# Patient Record
Sex: MALE | Race: WHITE | NOT HISPANIC OR LATINO | URBAN - METROPOLITAN AREA
[De-identification: names, ages, dates, MRNs, and addresses within clinical notes are randomized per-mention and may not be internally consistent; named-entity substitution may affect disease eponyms.]

---

## 2018-02-20 ENCOUNTER — TRANSFERRED RECORDS (OUTPATIENT)
Dept: HEALTH INFORMATION MANAGEMENT | Facility: CLINIC | Age: 53
End: 2018-02-20

## 2018-03-02 ENCOUNTER — PRE VISIT (OUTPATIENT)
Dept: UROLOGY | Facility: CLINIC | Age: 53
End: 2018-03-02

## 2018-03-02 NOTE — TELEPHONE ENCOUNTER
MEDICAL RECORDS REQUEST   [facility]  Urology Clinic  [address] [place] [address]  PHONE: [phone]  Fax: [phone]        FUTURE VISIT INFORMATION                                                   Miller Bauer, : 1965 scheduled for future visit at [facility]    APPOINTMENT INFORMATION:    Date: 2018    Provider:  LAKISHA GREENE    Reason for Visit/Diagnosis: PENILE ENTRAPMENT, BALANITIS     REFERRAL INFORMATION:    Referring provider:  ADELITA JO     Specialty: NP    Referring providers clinic:  [facility] contact number:  [phone]    RECORDS REQUESTED FOR VISIT                                                     NOTES  STATUS/DETAILS   OFFICE NOTE from referring provider  yes   OFFICE NOTE from other specialist  yes   DISCHARGE SUMMARY from hospital  no   DISCHARGE REPORT from the ER  no   OPERATIVE REPORT  yes   MEDICATION LIST  yes   LABS     URINALYSIS (UA)  yes   URINE CYTOLOGY  no   DIAGNOSIS SPECIFIC LABS     PENILE ENTRAPMENT AND BALANITIS  RECORDS RECEIVED SENT TO SCANNING  yes          PRE-VISIT CHECKLIST      Record collection complete Yes   Appointment appropriately scheduled           (right time/right provider) Yes   Joint diagnostic appointment coordinated correctly          (ensure right order & amount of time) Yes   MyChart activation If no,please explain IN PROCESS   Questionnaire complete If no, please explain IN PROCESS     Completed by: Regina Salomon

## 2018-04-10 ENCOUNTER — PRE VISIT (OUTPATIENT)
Dept: UROLOGY | Facility: CLINIC | Age: 53
End: 2018-04-10

## 2018-04-10 NOTE — TELEPHONE ENCOUNTER
Reason for visit: trapped penis consult     Relevant information: NA    Records/imaging/labs: note available    Pt called: no need for a call    Rooming: regular

## 2018-04-16 ENCOUNTER — OFFICE VISIT (OUTPATIENT)
Dept: UROLOGY | Facility: CLINIC | Age: 53
End: 2018-04-16
Payer: COMMERCIAL

## 2018-04-16 VITALS
HEIGHT: 75 IN | SYSTOLIC BLOOD PRESSURE: 153 MMHG | BODY MASS INDEX: 39.17 KG/M2 | HEART RATE: 82 BPM | WEIGHT: 315 LBS | DIASTOLIC BLOOD PRESSURE: 66 MMHG

## 2018-04-16 DIAGNOSIS — Q55.64 HIDDEN PENIS: Primary | ICD-10-CM

## 2018-04-16 RX ORDER — CARBAMAZEPINE 100 MG/1
300 TABLET, CHEWABLE ORAL
COMMUNITY
Start: 2017-05-10

## 2018-04-16 RX ORDER — ATORVASTATIN CALCIUM 20 MG/1
20 TABLET, FILM COATED ORAL
COMMUNITY
Start: 2018-04-03

## 2018-04-16 RX ORDER — NYSTATIN 100000 U/G
CREAM TOPICAL
COMMUNITY
Start: 2018-01-30

## 2018-04-16 RX ORDER — RAMIPRIL 10 MG/1
CAPSULE ORAL
COMMUNITY

## 2018-04-16 RX ORDER — CHLORTHALIDONE 25 MG/1
12.5 TABLET ORAL
COMMUNITY
Start: 2018-02-20 | End: 2018-05-21

## 2018-04-16 RX ORDER — METFORMIN HCL 500 MG
500 TABLET, EXTENDED RELEASE 24 HR ORAL
COMMUNITY
Start: 2017-05-16

## 2018-04-16 RX ORDER — RAMIPRIL 10 MG/1
CAPSULE ORAL
COMMUNITY
Start: 2017-08-30

## 2018-04-16 RX ORDER — AMLODIPINE BESYLATE 10 MG/1
10 TABLET ORAL
COMMUNITY
Start: 2018-02-20

## 2018-04-16 RX ORDER — FUROSEMIDE 20 MG
60 TABLET ORAL
COMMUNITY
Start: 2016-09-23

## 2018-04-16 ASSESSMENT — ENCOUNTER SYMPTOMS
HEMATURIA: 0
HEMOPTYSIS: 0
DYSURIA: 1
WHEEZING: 1
COUGH: 1
SNORES LOUDLY: 0
POSTURAL DYSPNEA: 1
SHORTNESS OF BREATH: 1
COUGH DISTURBING SLEEP: 1
SPUTUM PRODUCTION: 1
FLANK PAIN: 0
DYSPNEA ON EXERTION: 1
DIFFICULTY URINATING: 0

## 2018-04-16 ASSESSMENT — PAIN SCALES - GENERAL: PAINLEVEL: NO PAIN (0)

## 2018-04-16 NOTE — PROGRESS NOTES
Name: Miller Bauer    MRN: [id number]   YOB: 1965                 Chief Complaint:   Trapped penis          History of Present Illness:   Mr. Miller Bauer is a 52 year old male seen in consultation from Dr. Jeffers for trapped penis.  He reports that this has been an issue for the past 2 years since gaining ~75 pounds following an accident (slipped on ice and broke arm/shoulder).  He is unable to retract the skin over his penis at all. He urinates by sitting and letting the urine drip out.  He does still get erections with tenting of his skin but he cannot visualize his glans with erections.  He will have cracking and bleeding of his skin if he tries to free his penis from his skin with erections.  He has had cellulitis of his suprapubic area which required inpatient hospitalization last year.      Regarding his urologic history, he was circumcised as a . His urologist in [place] treated him with a 1 month course of nystatin with no improvement in symptoms. He was subsequently referred here for further cares.            Past Medical History:   No past medical history on file.         Past Surgical History:   No past surgical history on file.         Social History:     Social History   Substance Use Topics     Smoking status: Former Smoker     Quit date: 2013     Smokeless tobacco: Never Used     Alcohol use Not on file            Family History:   No family history on file.           Allergies:   No Known Allergies         Medications:     Current Outpatient Prescriptions   Medication Sig     amLODIPine (NORVASC) 10 MG tablet Take 10 mg by mouth     atorvastatin (LIPITOR) 20 MG tablet Take 20 mg by mouth     carBAMazepine (TEGRETOL) 100 MG chewable tablet Take 300 mg by mouth     chlorthalidone (HYGROTON) 25 MG tablet Take 12.5 mg by mouth     furosemide (LASIX) 20 MG tablet Take 60 mg by mouth     metFORMIN (GLUCOPHAGE-XR) 500 MG 24 hr tablet Take 500 mg by mouth     nystatin  "(MYCOSTATIN) cream      ramipril (ALTACE) 10 MG capsule TAKE 2 CAPSULES BY MOUTH EVERY DAY     ramipril (ALTACE) 10 MG capsule TAKE 1 CAPSULE BY MOUTH DAILY     No current facility-administered medications for this visit.              Review of Systems:    ROS: 14 point ROS neg other than the symptoms noted above in the HPI and PMH.          Physical Exam:   B/P: 153/66, T: Data Unavailable, P: 82, R: Data Unavailable  Estimated body mass index is 57.05 kg/(m^2) as calculated from the following:    Height as of this encounter: 1.905 m (6' 3\").    Weight as of this encounter: 207 kg (456 lb 6.4 oz).  General: age-appropriate appearing male in NAD. Morbidly obese body habitus.  HEENT: Head AT/NC, EOMI, CN Grossly intact  Resp: no respiratory distress  CV: Extremities warm  Abdomen: severe obesity , soft, non-distended, non-tender. Large abdominal pannus continues to overhanging fat pad with no distinct suprapubic fat pad  : Trapped penis with erythema and cracking of cicatrix; roughly 2x2 cm opening with tip of glans partially visible.   Rectal exam: deferred  Neuro: grossly intact  Motor: excellent strength throughout  Skin: clear of rashes or ecchymoses.        Labs:    All laboratory data reviewed with patient  Significant for Hgb A1c 5.8 (2017) per Care Everywhere      Imaging:    None      Outside records:    I spent 10 minutes reviewing outside records.           Assessment and Plan:   52 year old male with trapped penis. Plan is for referral to plastic surgery for surgery discussion and other orders as listed below.    -We discussed the etiology of trapped penis and the need for surgical repair with panniculectomy and skin grafting to repair the skin defect. We further discussed the rationale for additional weight loss prior to surgery. He will continue working with bariatrics in [place] and will return to our clinic for a consultation with Dr. Flannery of plastic surgery once he is at 400 pounds. He does not need " to be seen in our clinic again until he is cleared by Dr. Flannery for surgery. Once he is cleared by Dr. Flannery, we will proceed with surgery planning on our end.     No orders of the defined types were placed in this encounter.      F/U: Patient to follow up with Donald Flannery once less than 400 pounds. No need to see me again unless we are proceeding with surgery. We recommend he continue to work on weight loss with the bariatric team with whom he already has established care.        Bryn Bhakta MD  April 16, 2018       =======================================================  As the attending surgeon I, Juan Carlos Jeronimo, interviewed and examined the patient. The plan was developed between me and the patient. My findings and plan are as stated by the resident.    Juan Carlos Jeronimo MD      Answers for HPI/ROS submitted by the patient on 4/16/2018   General Symptoms: No  Skin Symptoms: No  HENT Symptoms: No  EYE SYMPTOMS: No  HEART SYMPTOMS: No  LUNG SYMPTOMS: Yes  INTESTINAL SYMPTOMS: No  URINARY SYMPTOMS: Yes  REPRODUCTIVE SYMPTOMS: No  SKELETAL SYMPTOMS: No  BLOOD SYMPTOMS: No  NERVOUS SYSTEM SYMPTOMS: No  MENTAL HEALTH SYMPTOMS: No  Cough: Yes  Sputum or phlegm: Yes  Coughing up blood: No  Difficulty breating or shortness of breath: Yes  Snoring: No  Wheezing: Yes  Difficulty breathing on exertion: Yes  Nighttime Cough: Yes  Difficulty breathing when lying flat: Yes  Trouble holding urine or incontinence: No  Pain or burning: Yes  Trouble starting or stopping: No  Increased frequency of urination: Yes  Blood in urine: No  Decreased frequency of urination: Yes  Frequent nighttime urination: No  Flank pain: No  Difficulty emptying bladder: No

## 2018-04-16 NOTE — MR AVS SNAPSHOT
After Visit Summary   2018    Miller Bauer    MRN: [id number]           Patient Information     Date Of Birth          1965        Visit Information        Provider Department      2018 7:00 AM Juan Carlos Jeronimo MD [facility]        Care Instructions    Please follow up with Dr. Flannery once your under 400 lbs.    It was a pleasure meeting with you today.  Thank you for allowing me and my team the privilege of caring for you today.  YOU are the reason we are here, and I truly hope we provided you with the excellent service you deserve.  Please let us know if there is anything else we can do for you so that we can be sure you are leaving completely satisfied with your care experience.      Donald Flannery MD  Department of Surgery  [address] [place] [address] [address]  [place] [address]    Phone number: [phone]              Follow-ups after your visit        Who to contact     Please call your clinic at [phone] to:    Ask questions about your health    Make or cancel appointments    Discuss your medicines    Learn about your test results    Speak to your doctor            Additional Information About Your Visit        MyChart Information     [employer] is an electronic gateway that provides easy, online access to your medical records. With [employer], you can request a clinic appointment, read your test results, renew a prescription or communicate with your care team.     To sign up for [employer] visit the website at [email or web address]   You will be asked to enter the access code listed below, as well as some personal information. Please follow the directions to create your username and password.     Your access code is: [email or web address]  Expires: 2018  4:56 PM     Your access code will  in 90 days. If you need help or a new code, please contact your [facility] or call  "[phone] for assistance.        Care EveryWhere ID     This is your Care EveryWhere ID. This could be used by other organizations to access your [facility] medical records  [id number]        Your Vitals Were     Pulse Height BMI (Body Mass Index)             82 1.905 m (6' 3\") 57.05 kg/m2          Blood Pressure from Last 3 Encounters:   04/16/18 153/66    Weight from Last 3 Encounters:   04/16/18 (!) 207 kg (456 lb 6.4 oz)              Today, you had the following     No orders found for display       Primary Care Provider    Carlos Gibbs       No address on file        Equal Access to Services     [facility]: Hadii aad ku hadasho Soomaali, waaxda luqadaha, qaybta kaalmada adeegyada, shahrzad valles hayaan lillian nino . So [facility] [phone].    ATENCIÓN: Si habla español, tiene a pettit disposición servicios gratuitos de asistencia lingüística. Llame al [phone].    We comply with applicable federal civil rights laws and [place] laws. We do not discriminate on the basis of race, color, national origin, age, disability, sex, sexual orientation, or gender identity.            Thank you!     Thank you for choosing [facility] UROLOGY AND [facility] FOR PROSTATE AND UROLOGIC CANCERS  for your care. Our goal is always to provide you with excellent care. Hearing back from our patients is one way we can continue to improve our services. Please take a few minutes to complete the written survey that you may receive in the mail after your visit with us. Thank you!             Your Updated Medication List - Protect others around you: Learn how to safely use, store and throw away your medicines at www.disposemymeds.org.          This list is accurate as of 4/16/18  8:19 AM.  Always use your most recent med list.                   Brand Name Dispense Instructions for use Diagnosis    amLODIPine 10 MG tablet    NORVASC     Take 10 mg by mouth        atorvastatin 20 MG tablet    LIPITOR     Take 20 mg by mouth        " carBAMazepine 100 MG chewable tablet    TEGretol     Take 300 mg by mouth        chlorthalidone 25 MG tablet    HYGROTON     Take 12.5 mg by mouth        furosemide 20 MG tablet    LASIX     Take 60 mg by mouth        metFORMIN 500 MG 24 hr tablet    GLUCOPHAGE-XR     Take 500 mg by mouth        nystatin cream    MYCOSTATIN          * ramipril 10 MG capsule    ALTACE     TAKE 1 CAPSULE BY MOUTH DAILY        * ramipril 10 MG capsule    ALTACE     TAKE 2 CAPSULES BY MOUTH EVERY DAY        * Notice:  This list has 2 medication(s) that are the same as other medications prescribed for you. Read the directions carefully, and ask your doctor or other care provider to review them with you.

## 2018-04-16 NOTE — NURSING NOTE
"No chief complaint on file.      Blood pressure 153/66, pulse 82, height 1.905 m (6' 3\"), weight (!) 207 kg (456 lb 6.4 oz). Body mass index is 57.05 kg/(m^2).    There is no problem list on file for this patient.      No Known Allergies    Current Outpatient Prescriptions   Medication Sig Dispense Refill     amLODIPine (NORVASC) 10 MG tablet Take 10 mg by mouth       atorvastatin (LIPITOR) 20 MG tablet Take 20 mg by mouth       carBAMazepine (TEGRETOL) 100 MG chewable tablet Take 300 mg by mouth       chlorthalidone (HYGROTON) 25 MG tablet Take 12.5 mg by mouth       furosemide (LASIX) 20 MG tablet Take 60 mg by mouth       metFORMIN (GLUCOPHAGE-XR) 500 MG 24 hr tablet Take 500 mg by mouth       nystatin (MYCOSTATIN) cream        ramipril (ALTACE) 10 MG capsule TAKE 2 CAPSULES BY MOUTH EVERY DAY       ramipril (ALTACE) 10 MG capsule TAKE 1 CAPSULE BY MOUTH DAILY         Social History   Substance Use Topics     Smoking status: Former Smoker     Quit date: 4/16/2013     Smokeless tobacco: Never Used     Alcohol use Not on file       RAHAT Leonard  4/16/2018  7:12 AM       "

## 2018-04-16 NOTE — LETTER
2018       RE: Miller Bauer  [address] [facility] [address]     Dear Colleague,    Thank you for referring your patient, Miller Bauer, to the [facility] UROLOGY AND INST FOR PROSTATE AND UROLOGIC CANCERS at [facility]. Please see a copy of my visit note below.      Name: Miller Bauer    MRN: [id number]   YOB: 1965                 Chief Complaint:   Trapped penis          History of Present Illness:   Mr. Miller Bauer is a 52 year old male seen in consultation from Dr. Jeffers for trapped penis.  He reports that this has been an issue for the past 2 years since gaining ~75 pounds following an accident (slipped on ice and broke arm/shoulder).  He is unable to retract the skin over his penis at all. He urinates by sitting and letting the urine drip out.  He does still get erections with tenting of his skin but he cannot visualize his glans with erections.  He will have cracking and bleeding of his skin if he tries to free his penis from his skin with erections.  He has had cellulitis of his suprapubic area which required inpatient hospitalization last year.      Regarding his urologic history, he was circumcised as a . His urologist in [place] treated him with a 1 month course of nystatin with no improvement in symptoms. He was subsequently referred here for further cares.            Past Medical History:   No past medical history on file.         Past Surgical History:   No past surgical history on file.         Social History:     Social History   Substance Use Topics     Smoking status: Former Smoker     Quit date: 2013     Smokeless tobacco: Never Used     Alcohol use Not on file            Family History:   No family history on file.           Allergies:   No Known Allergies         Medications:     Current Outpatient Prescriptions   Medication Sig     amLODIPine (NORVASC) 10 MG tablet Take 10 mg by mouth     atorvastatin  "(LIPITOR) 20 MG tablet Take 20 mg by mouth     carBAMazepine (TEGRETOL) 100 MG chewable tablet Take 300 mg by mouth     chlorthalidone (HYGROTON) 25 MG tablet Take 12.5 mg by mouth     furosemide (LASIX) 20 MG tablet Take 60 mg by mouth     metFORMIN (GLUCOPHAGE-XR) 500 MG 24 hr tablet Take 500 mg by mouth     nystatin (MYCOSTATIN) cream      ramipril (ALTACE) 10 MG capsule TAKE 2 CAPSULES BY MOUTH EVERY DAY     ramipril (ALTACE) 10 MG capsule TAKE 1 CAPSULE BY MOUTH DAILY     No current facility-administered medications for this visit.              Review of Systems:    ROS: 14 point ROS neg other than the symptoms noted above in the HPI and PMH.          Physical Exam:   B/P: 153/66, T: Data Unavailable, P: 82, R: Data Unavailable  Estimated body mass index is 57.05 kg/(m^2) as calculated from the following:    Height as of this encounter: 1.905 m (6' 3\").    Weight as of this encounter: 207 kg (456 lb 6.4 oz).  General: age-appropriate appearing male in NAD. Morbidly obese body habitus.  HEENT: Head AT/NC, EOMI, CN Grossly intact  Resp: no respiratory distress  CV: Extremities warm  Abdomen: severe obesity , soft, non-distended, non-tender. Large abdominal pannus continues to overhanging fat pad with no distinct suprapubic fat pad  : Trapped penis with erythema and cracking of cicatrix; roughly 2x2 cm opening with tip of glans partially visible.   Rectal exam: deferred  Neuro: grossly intact  Motor: excellent strength throughout  Skin: clear of rashes or ecchymoses.        Labs:    All laboratory data reviewed with patient  Significant for Hgb A1c 5.8 (2017) per Care Everywhere      Imaging:    None      Outside records:    I spent 10 minutes reviewing outside records.           Assessment and Plan:   52 year old male with trapped penis. Plan is for referral to plastic surgery for surgery discussion and other orders as listed below.    -We discussed the etiology of trapped penis and the need for surgical repair " with panniculectomy and skin grafting to repair the skin defect. We further discussed the rationale for additional weight loss prior to surgery. He will continue working with bariatrics in [place] and will return to our clinic for a consultation with Dr. Flannery of plastic surgery once he is at 400 pounds. He does not need to be seen in our clinic again until he is cleared by Dr. Flannery for surgery. Once he is cleared by Dr. Flannery, we will proceed with surgery planning on our end.     No orders of the defined types were placed in this encounter.      F/U: Patient to follow up with Donald Flannery once less than 400 pounds. No need to see me again unless we are proceeding with surgery. We recommend he continue to work on weight loss with the bariatric team with whom he already has established care.        Bryn Bhakta MD  April 16, 2018       =======================================================  As the attending surgeon I, Juan Carlos Jeronimo, interviewed and examined the patient. The plan was developed between me and the patient. My findings and plan are as stated by the resident.      Again, thank you for allowing me to participate in the care of your patient.      Sincerely,    Juan Carlos Jeronimo MD

## 2018-04-16 NOTE — PATIENT INSTRUCTIONS
Please follow up with Dr. Flannery once your under 400 lbs.    It was a pleasure meeting with you today.  Thank you for allowing me and my team the privilege of caring for you today.  YOU are the reason we are here, and I truly hope we provided you with the excellent service you deserve.  Please let us know if there is anything else we can do for you so that we can be sure you are leaving completely satisfied with your care experience.      Donald Flannery MD  Department of Surgery  [address] [place] [address] [address]  [place] [address]    Phone number: [phone]

## 2020-07-01 ENCOUNTER — PRE VISIT (OUTPATIENT)
Dept: UROLOGY | Facility: CLINIC | Age: 55
End: 2020-07-01

## 2020-07-01 NOTE — TELEPHONE ENCOUNTER
Chief Complaint : Return-Virtual    Hx/Sx: buried penis consult     Records/Orders: Available     Pt Contacted: n/a    At Rooming: Check In     Mobile: [phone]

## 2020-07-08 ENCOUNTER — VIRTUAL VISIT (OUTPATIENT)
Dept: UROLOGY | Facility: CLINIC | Age: 55
End: 2020-07-08

## 2020-07-08 DIAGNOSIS — N48.83 ACQUIRED BURIED PENIS: Primary | ICD-10-CM

## 2020-07-08 NOTE — LETTER
"7/8/2020       RE: Miller Bauer  [address] [facility] [address]     Dear Colleague,    Thank you for referring your patient, Miller Bauer, to the [facility] UROLOGY AND INST FOR PROSTATE AND UROLOGIC CANCERS at [facility]. Please see a copy of my visit note below.    Miller Bauer is a 55 year old male who is being evaluated via a billable telephone visit.      The patient has been notified of following:     \"This telephone visit will be conducted via a call between you and your physician/provider. We have found that certain health care needs can be provided without the need for a physical exam.  This service lets us provide the care you need with a short phone conversation.  If a prescription is necessary we can send it directly to your pharmacy.  If lab work is needed we can place an order for that and you can then stop by our lab to have the test done at a later time.    Telephone visits are billed at different rates depending on your insurance coverage. During this emergency period, for some insurers they may be billed the same as an in-person visit.  Please reach out to your insurance provider with any questions.    If during the course of the call the physician/provider feels a telephone visit is not appropriate, you will not be charged for this service.\"    Patient has given verbal consent for Telephone visit?  Yes    What phone number would you like to be contacted at? [phone]    How would you like to obtain your AVS? 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  He has been dealing with this for 3 years. He is .  He was circumcised as an infant.  He was weighed last month at 474 lbs. 215 kg. Which is increased from 2 years ago  He was seen 2 months ago. He made some efforts to lose weight.     A/P   Needs buried penis    Needs weight loss. We discussed " reasons for weight loss and strategies. Numerous questions were answered.    Followup in 12 weeks with another phone call. Will get weighed before visit. Will plan on emailed pictures.    Colin Kennedy MD

## 2020-07-08 NOTE — PROGRESS NOTES
"Miller Bauer is a 55 year old male who is being evaluated via a billable telephone visit.      The patient has been notified of following:     \"This telephone visit will be conducted via a call between you and your physician/provider. We have found that certain health care needs can be provided without the need for a physical exam.  This service lets us provide the care you need with a short phone conversation.  If a prescription is necessary we can send it directly to your pharmacy.  If lab work is needed we can place an order for that and you can then stop by our lab to have the test done at a later time.    Telephone visits are billed at different rates depending on your insurance coverage. During this emergency period, for some insurers they may be billed the same as an in-person visit.  Please reach out to your insurance provider with any questions.    If during the course of the call the physician/provider feels a telephone visit is not appropriate, you will not be charged for this service.\"    Patient has given verbal consent for Telephone visit?  Yes    What phone number would you like to be contacted at? [phone]    How would you like to obtain your AVS? 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  He has been dealing with this for 3 years. He is .  He was circumcised as an infant.  He was weighed last month at 474 lbs. 215 kg. Which is increased from 2 years ago  He was seen 2 months ago. He made some efforts to lose weight.     A/P   Needs buried penis    Needs weight loss. We discussed reasons for weight loss and strategies. Numerous questions were answered.    Followup in 12 weeks with another phone call. Will get weighed before visit. Will plan on emailed pictures.    Colin Kennedy MD  "

## 2020-07-10 ENCOUNTER — TELEPHONE (OUTPATIENT)
Dept: UROLOGY | Facility: CLINIC | Age: 55
End: 2020-07-10

## 2021-01-03 ENCOUNTER — HEALTH MAINTENANCE LETTER (OUTPATIENT)
Age: 56
End: 2021-01-03

## 2021-10-10 ENCOUNTER — HEALTH MAINTENANCE LETTER (OUTPATIENT)
Age: 56
End: 2021-10-10

## 2022-01-29 ENCOUNTER — HEALTH MAINTENANCE LETTER (OUTPATIENT)
Age: 57
End: 2022-01-29

## 2022-09-18 ENCOUNTER — HEALTH MAINTENANCE LETTER (OUTPATIENT)
Age: 57
End: 2022-09-18

## 2023-05-06 ENCOUNTER — HEALTH MAINTENANCE LETTER (OUTPATIENT)
Age: 58
End: 2023-05-06